# Patient Record
Sex: MALE | Race: WHITE | NOT HISPANIC OR LATINO | Employment: OTHER | URBAN - NONMETROPOLITAN AREA
[De-identification: names, ages, dates, MRNs, and addresses within clinical notes are randomized per-mention and may not be internally consistent; named-entity substitution may affect disease eponyms.]

---

## 2020-12-07 ENCOUNTER — OUTSIDE FACILITY SERVICE (OUTPATIENT)
Dept: CARDIOLOGY | Facility: CLINIC | Age: 70
End: 2020-12-07

## 2020-12-07 PROCEDURE — 93018 CV STRESS TEST I&R ONLY: CPT | Performed by: INTERNAL MEDICINE

## 2023-06-16 ENCOUNTER — OFFICE VISIT (OUTPATIENT)
Dept: CARDIOLOGY | Facility: CLINIC | Age: 73
End: 2023-06-16
Payer: MEDICARE

## 2023-06-16 VITALS
OXYGEN SATURATION: 95 % | DIASTOLIC BLOOD PRESSURE: 78 MMHG | HEIGHT: 69 IN | HEART RATE: 100 BPM | SYSTOLIC BLOOD PRESSURE: 138 MMHG | BODY MASS INDEX: 31.4 KG/M2 | WEIGHT: 212 LBS

## 2023-06-16 DIAGNOSIS — I10 PRIMARY HYPERTENSION: ICD-10-CM

## 2023-06-16 DIAGNOSIS — I25.10 CORONARY ARTERY DISEASE INVOLVING NATIVE CORONARY ARTERY OF NATIVE HEART WITHOUT ANGINA PECTORIS: Primary | ICD-10-CM

## 2023-06-16 PROBLEM — I48.19 PERSISTENT ATRIAL FIBRILLATION: Status: ACTIVE | Noted: 2023-06-16

## 2023-06-16 PROCEDURE — 3075F SYST BP GE 130 - 139MM HG: CPT | Performed by: STUDENT IN AN ORGANIZED HEALTH CARE EDUCATION/TRAINING PROGRAM

## 2023-06-16 PROCEDURE — 3078F DIAST BP <80 MM HG: CPT | Performed by: STUDENT IN AN ORGANIZED HEALTH CARE EDUCATION/TRAINING PROGRAM

## 2023-06-16 PROCEDURE — 99204 OFFICE O/P NEW MOD 45 MIN: CPT | Performed by: STUDENT IN AN ORGANIZED HEALTH CARE EDUCATION/TRAINING PROGRAM

## 2023-06-16 PROCEDURE — 93000 ELECTROCARDIOGRAM COMPLETE: CPT | Performed by: STUDENT IN AN ORGANIZED HEALTH CARE EDUCATION/TRAINING PROGRAM

## 2023-06-16 RX ORDER — WARFARIN SODIUM 7.5 MG/1
7.5 TABLET ORAL
COMMUNITY

## 2023-06-16 RX ORDER — ISOSORBIDE MONONITRATE 120 MG/1
120 TABLET, EXTENDED RELEASE ORAL DAILY
COMMUNITY

## 2023-06-16 RX ORDER — TRAZODONE HYDROCHLORIDE 50 MG/1
25 TABLET ORAL NIGHTLY
COMMUNITY

## 2023-06-16 RX ORDER — PANTOPRAZOLE SODIUM 40 MG/1
40 TABLET, DELAYED RELEASE ORAL DAILY
COMMUNITY

## 2023-06-16 RX ORDER — GEMFIBROZIL 600 MG/1
600 TABLET, FILM COATED ORAL
COMMUNITY

## 2023-06-16 RX ORDER — SACUBITRIL AND VALSARTAN 24; 26 MG/1; MG/1
1 TABLET, FILM COATED ORAL 2 TIMES DAILY
COMMUNITY

## 2023-06-16 NOTE — PROGRESS NOTES
[facility] at [facility]  INITIAL OFFICE CONSULT      Patrick Mayberry  1950  PCP: Idalia Marinelli DO    SUBJECTIVE:   Patrick Mayberry is a 73 y.o. male seen for a consultation visit regarding the following:     Chief Complaint: No chief complaint on file.         Consultation is requested by Jimmy Schroeder MD for evaluation of No chief complaint on file.        History:    Mr. Mayberry is a pleasant 73-year-old man with a history of atrial flutter who is referred for evaluation of this.  He was initially diagnosed with atrial flutter by Dr. Schroeder.  He reports that he was scheduled for cardioversion, but this was canceled due to him being bradycardic at that time.  I am uncertain if he was back in sinus rhythm at that this time where he was bradycardic in atrial flutter.  He does think he has some symptoms with this, he admits to a sensation of a vibration feeling in his chest.  He also has been significantly fatigued and has had lower extremity swelling that is been difficult to control.    Cardiac PMH: (Old records have been reviewed and summarized below)  1. Afib/Flutter  a. EKG January 2023 A-fib/flutter rate controlled right bundle branch block  b. Chads vascular score equal to 4  2. CAD  a. Remote MI/CABG and DM in 1995  b. Stress test February 2023 inferior small area of scar no ischemia Dr. Schroeder  c. Echocardiogram February 2023 normal LV function mild MR  3. Hypertension  4. Hyperlipidemia  5. Obstructive sleep apnea: CPAP  6. Diabetes mellitus type 2  7. GERD  8. BPH      Past Medical History, Past Surgical History, Family history, Social History, and Medications were all reviewed with the patient today and updated as necessary.     Current Outpatient Medications   Medication Sig Dispense Refill   • albuterol (PROVENTIL HFA;VENTOLIN HFA) 108 (90 BASE) MCG/ACT inhaler Inhale 2 puffs as needed for wheezing.     • aspirin 81 MG EC tablet Take 81 mg by mouth daily.     • atorvastatin  (LIPITOR) 40 MG tablet Take 40 mg by mouth every night.     • calcium carbonate-vitamin d (CALCIUM 600+D) 600-400 MG-UNIT per tablet Take 1 tablet by mouth 2 (two) times a day.     • clopidogrel (PLAVIX) 75 MG tablet Take 75 mg by mouth daily.     • fenofibrate 160 MG tablet Take 160 mg by mouth daily.     • furosemide (LASIX) 40 MG tablet Take 40 mg by mouth as needed.     • glipiZIDE (GLUCOTROL) 10 MG tablet Take 10 mg by mouth 3 (three) times a day.     • HYDROcodone-acetaminophen (NORCO) 7.5-325 MG per tablet Take 1 tablet by mouth 4 (four) times a day.     • insulin aspart protamine-insulin aspart (NovoLOG 70/30) (70-30) 100 UNIT/ML injection Inject 76 Units under the skin 2 (two) times a day with meals.     • losartan-hydrochlorothiazide (HYZAAR) 50-12.5 MG per tablet Take 1 tablet by mouth daily.     • metFORMIN (GLUCOPHAGE) 500 MG tablet 2 tablets twice daily     • metoprolol tartrate (LOPRESSOR) 25 MG tablet Take 25 mg by mouth 2 (two) times a day.     • omeprazole (PriLOSEC) 20 MG capsule Take 20 mg by mouth daily.     • potassium chloride (K-DUR) 10 MEQ CR tablet Take 10 mEq by mouth as needed.     • sitaGLIPtin (JANUVIA) 100 MG tablet Take 100 mg by mouth daily.     • tamsulosin (FLOMAX) 0.4 MG capsule 24 hr capsule Take 1 capsule by mouth every night.     • zaleplon (SONATA) 10 MG capsule Take 10 mg by mouth every night.       No current facility-administered medications for this visit.     No Known Allergies      Past Medical History:   Diagnosis Date   • CAD (coronary artery disease)     s/p CABG 1995   • COPD (chronic obstructive pulmonary disease)    • Diabetes mellitus    • Hyperlipidemia    • Hypertension    • Sleep apnea     Sleep study several years ago (dx with sleep apnea)- using CPAP     Past Surgical History:   Procedure Laterality Date   • CATH LAB PROCEDURE  2008    [facility]:  Patent LIMA, patent SVG to RCA, multiple stenosis of PDA (small caliber), occluded SVG to OM   • CORONARY ARTERY  BYPASS GRAFT     • ECHO - CONVERTED  2012    Echo-EF 55-60%   • LEG SURGERY Right     Rt leg-steel rods placed due to fracture.     Family History   Problem Relation Age of Onset   • No Known Problems Mother    • No Known Problems Father    • Hyperlipidemia Daughter      Social History     Tobacco Use   • Smoking status: Former     Types: Cigarettes     Quit date:      Years since quittin.4   • Smokeless tobacco: Never   Substance Use Topics   • Alcohol use: No       ROS:  Review of Symptoms:  General: no recent weight loss/gain, weakness or fatigue  Skin: no rashes, lumps, or other skin changes  HEENT: no dizziness, lightheadedness, or vision changes  Respiratory: no cough or hemoptysis  Cardiovascular: no palpitations, and tachycardia  Gastrointestinal: no black/tarry stools or diarrhea  Urinary: no change in frequency or urgency  Peripheral Vascular: no claudication or leg cramps  Musculoskeletal: no muscle or joint pain/stiffness  Psychiatric: no depression or excessive stress  Neurological: no sensory or motor loss, no syncope  Hematologic: no anemia, easy bruising or bleeding  Endocrine: no thyroid problems, nor heat or cold intolerance         PHYSICAL EXAM:   There were no vitals taken for this visit.     Wt Readings from Last 5 Encounters:   16 95.7 kg (211 lb)     BP Readings from Last 5 Encounters:   16 140/78       General-Well Nourished, Well developed  Eyes - PERRLA  Neck- supple, No mass  CV- regular rate and rhythm, no MRG  Lung- clear bilaterally  Abd- soft, +BS  Musc/skel - Norm strength and range of motion  Skin- warm and dry  Neuro - Alert & Oriented x 3, appropriate mood.    Patient's external notes were reviewed.  Independent interpretation of test performed by another physician in facility were reviewed.  Outside laboratory data was also reviewed.    Medical problems and test results were reviewed with the patient today.     No results found for this or any  previous visit.      No results found for: CHOL, HDL, HDLC, LDL, LDLC, VLDL    EKG:  (EKG/Tracing has been independently visualized by me and summarized below)      ECG 12 Lead    Date/Time: 6/16/2023 2:19 PM  Performed by: Taj Sprague MD  Authorized by: Taj Sprague MD   Comparison: compared with previous ECG from 4/8/2010  Comparison to previous ECG: Atrial flutter is now present  Comments: Atrial flutter with 2-1 block, right bundle branch block, left anterior fascicular block          Advance Care Planning   ACP discussion was held with the patient during this visit. Patient does not have an advance directive, declines further assistance.       ASSESSMENT   1.  A-fib/flutter    2.  Hemic heart disease: Remote CABG procedure, recent stress test negative for ischemia and normal EF inferior scar normal echocardiogram mild MR    3.  Chads Vascor equal to 4      PLAN  Patient has a history of atrial flutter.  On his EKG he does have negative P waves in the inferior lead P waves in lead V1 are difficult to see.  There are some atypical features of this, but I feel this most likely still represents CTI dependent atrial flutter.  We discussed the management of this.  I think would be a good candidate for catheter ablation of this.  He is agreeable to this and we will work on scheduling it.    We also discussed relationship between atrial flutter and atrial fibrillation.  We discussed that the majority of people with atrial flutter will go on to develop atrial fibrillation.  I cannot find any EKGs of atrial fibrillation at the current time, and we discussed that I would not perform empiric treatment for this currently.  We did discuss that we will need to monitor for atrial fibrillation in the future and may need to treat that if it arises.    He will continue warfarin for anticoagulation

## 2023-06-19 ENCOUNTER — TELEPHONE (OUTPATIENT)
Dept: CARDIOLOGY | Facility: CLINIC | Age: 73
End: 2023-06-19
Payer: MEDICARE

## 2023-06-19 DIAGNOSIS — I48.92 ATRIAL FLUTTER, UNSPECIFIED TYPE: Primary | ICD-10-CM

## 2023-06-19 NOTE — TELEPHONE ENCOUNTER
Taj Sprague MD Childers, Tracy R, RN  no          Previous Messages       ----- Message -----  From: Izzy Castro RN  Sent: 6/19/2023   8:58 AM EDT  To: Taj Sprague MD    Does he need to hold any meds prior to?  ----- Message -----  From: Taj Sprague MD  Sent: 6/16/2023   2:18 PM EDT  To: Izzy Castro RN, *    Can we set him up for an atrial flutter ablation?

## 2023-06-21 PROBLEM — I48.92 ATRIAL FLUTTER: Status: ACTIVE | Noted: 2023-06-21

## 2023-08-16 ENCOUNTER — HOSPITAL ENCOUNTER (OUTPATIENT)
Facility: HOSPITAL | Age: 73
Setting detail: HOSPITAL OUTPATIENT SURGERY
Discharge: HOME | End: 2023-08-16
Attending: STUDENT IN AN ORGANIZED HEALTH CARE EDUCATION/TRAINING PROGRAM | Admitting: STUDENT IN AN ORGANIZED HEALTH CARE EDUCATION/TRAINING PROGRAM
Payer: MEDICARE

## 2023-08-16 VITALS
DIASTOLIC BLOOD PRESSURE: 78 MMHG | TEMPERATURE: 97.3 F | BODY MASS INDEX: 28.97 KG/M2 | HEART RATE: 76 BPM | WEIGHT: 195.6 LBS | OXYGEN SATURATION: 98 % | RESPIRATION RATE: 12 BRPM | HEIGHT: 69 IN | SYSTOLIC BLOOD PRESSURE: 150 MMHG

## 2023-08-16 DIAGNOSIS — I48.92 ATRIAL FLUTTER, UNSPECIFIED TYPE: ICD-10-CM

## 2023-08-16 PROBLEM — I48.91 ATRIAL FIBRILLATION AND FLUTTER: Status: ACTIVE | Noted: 2023-06-21

## 2023-08-16 PROBLEM — I48.3 TYPICAL ATRIAL FLUTTER: Status: ACTIVE | Noted: 2023-06-21

## 2023-08-16 LAB
ANION GAP SERPL CALCULATED.3IONS-SCNC: 11 MMOL/L (ref 5–15)
BUN SERPL-MCNC: 20 MG/DL (ref 8–23)
BUN/CREAT SERPL: 16.5 (ref 7–25)
CALCIUM SPEC-SCNC: 9.4 MG/DL (ref 8.6–10.5)
CHLORIDE SERPL-SCNC: 102 MMOL/L (ref 98–107)
CO2 SERPL-SCNC: 26 MMOL/L (ref 22–29)
CREAT SERPL-MCNC: 1.21 MG/DL (ref 0.76–1.27)
DEPRECATED RDW RBC AUTO: 45.5 FL (ref 37–54)
EGFRCR SERPLBLD CKD-EPI 2021: 63.2 ML/MIN/1.73
ERYTHROCYTE [DISTWIDTH] IN BLOOD BY AUTOMATED COUNT: 15.4 % (ref 12.3–15.4)
GLUCOSE BLDC GLUCOMTR-MCNC: 148 MG/DL (ref 70–130)
GLUCOSE SERPL-MCNC: 192 MG/DL (ref 65–99)
HCT VFR BLD AUTO: 47.9 % (ref 37.5–51)
HGB BLD-MCNC: 15.5 G/DL (ref 13–17.7)
INR PPP: 2.39 (ref 0.89–1.12)
MCH RBC QN AUTO: 26.5 PG (ref 26.6–33)
MCHC RBC AUTO-ENTMCNC: 32.4 G/DL (ref 31.5–35.7)
MCV RBC AUTO: 81.7 FL (ref 79–97)
PLATELET # BLD AUTO: 125 10*3/MM3 (ref 140–450)
PMV BLD AUTO: 10.8 FL (ref 6–12)
POTASSIUM SERPL-SCNC: 4.4 MMOL/L (ref 3.5–5.2)
PROTHROMBIN TIME: 26.2 SECONDS (ref 12.2–14.5)
RBC # BLD AUTO: 5.86 10*6/MM3 (ref 4.14–5.8)
SODIUM SERPL-SCNC: 139 MMOL/L (ref 136–145)
WBC NRBC COR # BLD: 7.05 10*3/MM3 (ref 3.4–10.8)

## 2023-08-16 PROCEDURE — C1731 CATH, EP, 20 OR MORE ELEC: HCPCS | Performed by: STUDENT IN AN ORGANIZED HEALTH CARE EDUCATION/TRAINING PROGRAM

## 2023-08-16 PROCEDURE — 82948 REAGENT STRIP/BLOOD GLUCOSE: CPT

## 2023-08-16 PROCEDURE — 85610 PROTHROMBIN TIME: CPT | Performed by: STUDENT IN AN ORGANIZED HEALTH CARE EDUCATION/TRAINING PROGRAM

## 2023-08-16 PROCEDURE — C1759 CATH, INTRA ECHOCARDIOGRAPHY: HCPCS | Performed by: STUDENT IN AN ORGANIZED HEALTH CARE EDUCATION/TRAINING PROGRAM

## 2023-08-16 PROCEDURE — C1732 CATH, EP, DIAG/ABL, 3D/VECT: HCPCS | Performed by: STUDENT IN AN ORGANIZED HEALTH CARE EDUCATION/TRAINING PROGRAM

## 2023-08-16 PROCEDURE — C1894 INTRO/SHEATH, NON-LASER: HCPCS | Performed by: STUDENT IN AN ORGANIZED HEALTH CARE EDUCATION/TRAINING PROGRAM

## 2023-08-16 PROCEDURE — 25010000002 ADENOSINE PER 6 MG: Performed by: STUDENT IN AN ORGANIZED HEALTH CARE EDUCATION/TRAINING PROGRAM

## 2023-08-16 PROCEDURE — 99152 MOD SED SAME PHYS/QHP 5/>YRS: CPT | Performed by: STUDENT IN AN ORGANIZED HEALTH CARE EDUCATION/TRAINING PROGRAM

## 2023-08-16 PROCEDURE — C1760 CLOSURE DEV, VASC: HCPCS | Performed by: STUDENT IN AN ORGANIZED HEALTH CARE EDUCATION/TRAINING PROGRAM

## 2023-08-16 PROCEDURE — 25010000002 MIDAZOLAM PER 1 MG: Performed by: STUDENT IN AN ORGANIZED HEALTH CARE EDUCATION/TRAINING PROGRAM

## 2023-08-16 PROCEDURE — 80048 BASIC METABOLIC PNL TOTAL CA: CPT | Performed by: PHYSICIAN ASSISTANT

## 2023-08-16 PROCEDURE — 99153 MOD SED SAME PHYS/QHP EA: CPT | Performed by: STUDENT IN AN ORGANIZED HEALTH CARE EDUCATION/TRAINING PROGRAM

## 2023-08-16 PROCEDURE — 93653 COMPRE EP EVAL TX SVT: CPT | Performed by: STUDENT IN AN ORGANIZED HEALTH CARE EDUCATION/TRAINING PROGRAM

## 2023-08-16 PROCEDURE — 25010000002 ONDANSETRON PER 1 MG: Performed by: STUDENT IN AN ORGANIZED HEALTH CARE EDUCATION/TRAINING PROGRAM

## 2023-08-16 PROCEDURE — C1766 INTRO/SHEATH,STRBLE,NON-PEEL: HCPCS | Performed by: STUDENT IN AN ORGANIZED HEALTH CARE EDUCATION/TRAINING PROGRAM

## 2023-08-16 PROCEDURE — 85027 COMPLETE CBC AUTOMATED: CPT | Performed by: PHYSICIAN ASSISTANT

## 2023-08-16 PROCEDURE — 25010000002 FENTANYL CITRATE (PF) 50 MCG/ML SOLUTION: Performed by: STUDENT IN AN ORGANIZED HEALTH CARE EDUCATION/TRAINING PROGRAM

## 2023-08-16 PROCEDURE — 94660 CPAP INITIATION&MGMT: CPT

## 2023-08-16 PROCEDURE — 93623 PRGRMD STIMJ&PACG IV RX NFS: CPT | Performed by: STUDENT IN AN ORGANIZED HEALTH CARE EDUCATION/TRAINING PROGRAM

## 2023-08-16 PROCEDURE — 25010000002 HEPARIN (PORCINE) PER 1000 UNITS: Performed by: STUDENT IN AN ORGANIZED HEALTH CARE EDUCATION/TRAINING PROGRAM

## 2023-08-16 PROCEDURE — 93662 INTRACARDIAC ECG (ICE): CPT | Performed by: STUDENT IN AN ORGANIZED HEALTH CARE EDUCATION/TRAINING PROGRAM

## 2023-08-16 RX ORDER — SODIUM CHLORIDE 0.9 % (FLUSH) 0.9 %
10 SYRINGE (ML) INJECTION AS NEEDED
Status: DISCONTINUED | OUTPATIENT
Start: 2023-08-16 | End: 2023-08-16 | Stop reason: HOSPADM

## 2023-08-16 RX ORDER — ADENOSINE 3 MG/ML
INJECTION, SOLUTION INTRAVENOUS
Status: DISCONTINUED | OUTPATIENT
Start: 2023-08-16 | End: 2023-08-16 | Stop reason: HOSPADM

## 2023-08-16 RX ORDER — ACETAMINOPHEN 325 MG/1
650 TABLET ORAL EVERY 4 HOURS PRN
Status: DISCONTINUED | OUTPATIENT
Start: 2023-08-16 | End: 2023-08-16 | Stop reason: HOSPADM

## 2023-08-16 RX ORDER — ONDANSETRON 2 MG/ML
INJECTION INTRAMUSCULAR; INTRAVENOUS
Status: DISCONTINUED | OUTPATIENT
Start: 2023-08-16 | End: 2023-08-16 | Stop reason: HOSPADM

## 2023-08-16 RX ORDER — SODIUM CHLORIDE 9 MG/ML
INJECTION, SOLUTION INTRAVENOUS
Status: DISCONTINUED | OUTPATIENT
Start: 2023-08-16 | End: 2023-08-16 | Stop reason: HOSPADM

## 2023-08-16 RX ORDER — ACETAMINOPHEN 650 MG/1
650 SUPPOSITORY RECTAL EVERY 4 HOURS PRN
Status: DISCONTINUED | OUTPATIENT
Start: 2023-08-16 | End: 2023-08-16 | Stop reason: HOSPADM

## 2023-08-16 RX ORDER — FINASTERIDE 5 MG/1
5 TABLET, FILM COATED ORAL DAILY
COMMUNITY

## 2023-08-16 RX ORDER — ONDANSETRON 2 MG/ML
4 INJECTION INTRAMUSCULAR; INTRAVENOUS EVERY 6 HOURS PRN
Status: DISCONTINUED | OUTPATIENT
Start: 2023-08-16 | End: 2023-08-16 | Stop reason: HOSPADM

## 2023-08-16 RX ORDER — MIDAZOLAM HYDROCHLORIDE 1 MG/ML
INJECTION INTRAMUSCULAR; INTRAVENOUS
Status: DISCONTINUED | OUTPATIENT
Start: 2023-08-16 | End: 2023-08-16 | Stop reason: HOSPADM

## 2023-08-16 RX ORDER — NITROGLYCERIN 0.4 MG/1
0.4 TABLET SUBLINGUAL
Status: DISCONTINUED | OUTPATIENT
Start: 2023-08-16 | End: 2023-08-16 | Stop reason: HOSPADM

## 2023-08-16 RX ORDER — SODIUM CHLORIDE 9 MG/ML
40 INJECTION, SOLUTION INTRAVENOUS AS NEEDED
Status: DISCONTINUED | OUTPATIENT
Start: 2023-08-16 | End: 2023-08-16 | Stop reason: HOSPADM

## 2023-08-16 RX ORDER — FUROSEMIDE 20 MG/1
TABLET ORAL DAILY
COMMUNITY

## 2023-08-16 RX ORDER — SODIUM CHLORIDE 0.9 % (FLUSH) 0.9 %
3 SYRINGE (ML) INJECTION EVERY 12 HOURS SCHEDULED
Status: DISCONTINUED | OUTPATIENT
Start: 2023-08-16 | End: 2023-08-16 | Stop reason: HOSPADM

## 2023-08-16 RX ORDER — IBUPROFEN 200 MG
400 TABLET ORAL EVERY 6 HOURS PRN
Status: DISCONTINUED | OUTPATIENT
Start: 2023-08-16 | End: 2023-08-16 | Stop reason: HOSPADM

## 2023-08-16 RX ORDER — FENTANYL CITRATE 50 UG/ML
INJECTION, SOLUTION INTRAMUSCULAR; INTRAVENOUS
Status: DISCONTINUED | OUTPATIENT
Start: 2023-08-16 | End: 2023-08-16 | Stop reason: HOSPADM

## 2023-08-16 RX ORDER — HEPARIN SODIUM 1000 [USP'U]/ML
INJECTION, SOLUTION INTRAVENOUS; SUBCUTANEOUS
Status: DISCONTINUED | OUTPATIENT
Start: 2023-08-16 | End: 2023-08-16 | Stop reason: HOSPADM

## 2023-08-16 NOTE — H&P
[facility] Electrophysiology    Date of Hospital Visit: 23    Place of Service: [facility]    Patient Name: Patrick Mayberry  :1950      Primary Care Provider: Idalia Marinelli DO    Chief complaint/Reason for Consultation:  Atrial Flutter    Problem List:  Active Hospital Problems    Diagnosis  POA    **Typical atrial flutter [I48.3]  No     Priority: High     EKG 2023 A-fib/flutter rate controlled right bundle branch block  Chads vascular score equal to 4      CAD (coronary artery disease) [I25.10]  Yes     Priority: Medium     Remote MI/CABG and DM in   Stress test 2023 inferior small area of scar no ischemia Dr. Schroeder  Echocardiogram 2023 normal LV function mild MR      HTN (hypertension) [I10]  Yes     Priority: Low    Hyperlipidemia [E78.5]  Yes    DM (diabetes mellitus) [E11.9]  Yes             History of Present Illness:  73-year-old male with history of CAD who presents with a history of atrial flutter.  He was initially scheduled for cardioversion however on presentation he was bradycardic.  He reportedly had an outpatient monitor study showing atrial flutter.  Unfortunately that exam is not available for our review.  He has remained in rate controlled atrial flutter.  He complains of associated exertional dyspnea.  His last dose of warfarin was this morning.        No Known Allergies    Current Outpatient Medications   Medication Instructions    aspirin 81 mg, Oral, Daily    atorvastatin (LIPITOR) 40 mg, Oral, Nightly    furosemide (LASIX) 40 mg, Oral, As Needed    gemfibrozil (LOPID) 600 mg, Oral, 2 Times Daily Before Meals    glipizide (GLUCOTROL) 10 mg, Oral, 3 Times Daily    HYDROcodone-acetaminophen (NORCO) 7.5-325 MG per tablet 1 tablet, Oral, 4 Times Daily    insulin aspart protamine-insulin aspart (NovoLOG 70/30) (70-30) 100 UNIT/ML injection 54 Units, Subcutaneous, 2 Times Daily With Meals    Insulin Degludec (TRESIBA SC)  "90 Units, Subcutaneous, 2 Times Daily    isosorbide mononitrate (IMDUR) 120 mg, Oral, Daily    pantoprazole (PROTONIX) 40 mg, Oral, Daily    sacubitril-valsartan (Entresto) 24-26 MG tablet 1 tablet, Oral, 2 Times Daily    SITagliptin (JANUVIA) 100 mg, Oral, Daily    tamsulosin (FLOMAX) 0.4 MG capsule 24 hr capsule 1 capsule, Oral, Nightly    traZODone (DESYREL) 25 mg, Oral, Nightly    warfarin (COUMADIN) 7.5 mg, Oral, Daily Warfarin           Social History     Socioeconomic History    Marital status:    Tobacco Use    Smoking status: Former     Types: Cigarettes     Quit date:      Years since quittin.6    Smokeless tobacco: Never   Substance and Sexual Activity    Alcohol use: No    Drug use: No       Family History   Problem Relation Age of Onset    No Known Problems Mother     No Known Problems Father     Hyperlipidemia Daughter        REVIEW OF SYSTEMS:   Review of Systems   Constitutional: Negative for diaphoresis, malaise/fatigue and weight gain.   Cardiovascular:  Positive for palpitations. Negative for chest pain, claudication, dyspnea on exertion, irregular heartbeat, leg swelling, orthopnea, paroxysmal nocturnal dyspnea and syncope.   Respiratory:  Positive for cough and shortness of breath. Negative for sleep disturbances due to breathing and wheezing.    Hematologic/Lymphatic: Negative for bleeding problem.   Musculoskeletal:  Negative for muscle cramps, muscle weakness and myalgias.   Gastrointestinal:  Negative for heartburn.   Neurological:  Negative for weakness.          Objective:  Vitals:    23 1102   Weight: 88.7 kg (195 lb 9.6 oz)   Height: 175.3 cm (69\")     Body mass index is 28.89 kg/mý.  Flowsheet Rows      Flowsheet Row First Filed Value   Admission Height 175.3 cm (69\") Documented at 2023 1102   Admission Weight 88.7 kg (195 lb 9.6 oz) Documented at 2023 1102            Constitutional:       Appearance: Well-developed.   Pulmonary:      Effort: Pulmonary " effort is normal. No respiratory distress.      Breath sounds: Normal breath sounds. No wheezing. No rales.      Comments: Bases clear  Chest:      Chest wall: Not tender to palpatation.   Cardiovascular:      Normal rate. Regular rhythm.      Murmurs: There is no murmur.      No gallop.  No click. No rub.   Pulses:     Intact distal pulses.   Edema:     Peripheral edema absent.   Musculoskeletal: Normal range of motion.         Lab Review:               CrCl cannot be calculated (No successful lab value found.).  No results found for: INR, PROTIME         Assessment:   Atrial flutter        Plan:   EP study with flutter ablation        DREW Poon

## 2023-08-17 ENCOUNTER — CALL CENTER PROGRAMS (OUTPATIENT)
Dept: CALL CENTER | Facility: HOSPITAL | Age: 73
End: 2023-08-17
Payer: MEDICARE

## 2023-08-17 NOTE — OUTREACH NOTE
PCI/Device Survey      Flowsheet Row Responses   Facility patient discharged from? [facility]   Procedure date 08/16/23   Procedure (if device, specify in description) Ablation  [bilaterial groin sites]   Performing MD Dr. Taj Sprague   Attempt successful? Yes   Call start time 0921   Call end time 0925   Person spoke with today (if not patient) and relationship patient   Has the patient had any of the following symptoms since discharge? --  [no issues]   Is the patient taking prescribed medications: ASA   Nursing intervention Reminded to continue to take prescribed medications, Nurse provided patient education   Does the patient have any of the following symptoms related to the cath/surgical site? --  [Bilateral dressing dry and intact to groin area. No bleeding or drainage, ]   Nursing intervention Patient education provided   Does the patient have an appointment scheduled with the cardiologist? Yes   Appointment comments Has a followup with Dr Sprague on Nov 17th   If the patient is a current smoker, are they able to teach back resources for cessation? Not a smoker   Did the patient feel prepared to go home on the same day as the procedure? Yes   Is the patient satisfied with the same day discharge process? Yes   PCI/Device call completed Yes   Wrap up additional comments No needs or concerns at this time. Patient very appreciative of care and staff at [facility].            Arnold ANDERS - Registered Nurse
